# Patient Record
Sex: FEMALE | Race: WHITE | NOT HISPANIC OR LATINO | Employment: FULL TIME | URBAN - METROPOLITAN AREA
[De-identification: names, ages, dates, MRNs, and addresses within clinical notes are randomized per-mention and may not be internally consistent; named-entity substitution may affect disease eponyms.]

---

## 2021-11-24 ENCOUNTER — OFFICE VISIT (OUTPATIENT)
Dept: OTOLARYNGOLOGY | Facility: CLINIC | Age: 31
End: 2021-11-24

## 2021-11-24 VITALS — HEIGHT: 65 IN | BODY MASS INDEX: 40.33 KG/M2 | WEIGHT: 242.06 LBS

## 2021-11-24 DIAGNOSIS — H66.92 LEFT OTITIS MEDIA, UNSPECIFIED OTITIS MEDIA TYPE: Primary | ICD-10-CM

## 2021-11-24 PROCEDURE — 99202 OFFICE O/P NEW SF 15 MIN: CPT | Mod: PBBFAC | Performed by: PHYSICIAN ASSISTANT

## 2021-11-24 PROCEDURE — 99203 PR OFFICE/OUTPT VISIT, NEW, LEVL III, 30-44 MIN: ICD-10-PCS | Mod: S$PBB,,, | Performed by: PHYSICIAN ASSISTANT

## 2021-11-24 PROCEDURE — 99999 PR PBB SHADOW E&M-NEW PATIENT-LVL II: CPT | Mod: PBBFAC,,, | Performed by: PHYSICIAN ASSISTANT

## 2021-11-24 PROCEDURE — 99999 PR PBB SHADOW E&M-NEW PATIENT-LVL II: ICD-10-PCS | Mod: PBBFAC,,, | Performed by: PHYSICIAN ASSISTANT

## 2021-11-24 PROCEDURE — 99203 OFFICE O/P NEW LOW 30 MIN: CPT | Mod: S$PBB,,, | Performed by: PHYSICIAN ASSISTANT

## 2021-11-24 RX ORDER — PREDNISONE 10 MG/1
TABLET ORAL
Qty: 13 TABLET | Refills: 0 | Status: ON HOLD | OUTPATIENT
Start: 2021-11-24 | End: 2023-10-25 | Stop reason: CLARIF

## 2021-11-24 RX ORDER — AMOXICILLIN AND CLAVULANATE POTASSIUM 875; 125 MG/1; MG/1
1 TABLET, FILM COATED ORAL EVERY 12 HOURS
Qty: 20 TABLET | Refills: 0 | Status: SHIPPED | OUTPATIENT
Start: 2021-11-24 | End: 2021-12-04

## 2021-11-24 RX ORDER — FLUOXETINE HYDROCHLORIDE 20 MG/1
20 CAPSULE ORAL DAILY
Status: ON HOLD | COMMUNITY
End: 2023-10-25 | Stop reason: CLARIF

## 2021-12-09 ENCOUNTER — TELEPHONE (OUTPATIENT)
Dept: OTOLARYNGOLOGY | Facility: CLINIC | Age: 31
End: 2021-12-09

## 2021-12-10 ENCOUNTER — TELEPHONE (OUTPATIENT)
Dept: OTOLARYNGOLOGY | Facility: CLINIC | Age: 31
End: 2021-12-10

## 2022-07-14 ENCOUNTER — OFFICE VISIT (OUTPATIENT)
Dept: PHYSICAL MEDICINE AND REHAB | Facility: CLINIC | Age: 32
End: 2022-07-14
Payer: COMMERCIAL

## 2022-07-14 VITALS — BODY MASS INDEX: 41.65 KG/M2 | HEIGHT: 65 IN | WEIGHT: 250 LBS

## 2022-07-14 DIAGNOSIS — S94.20XA: Primary | ICD-10-CM

## 2022-07-14 PROCEDURE — 99999 PR PBB SHADOW E&M-EST. PATIENT-LVL III: CPT | Mod: PBBFAC,,, | Performed by: PHYSICAL MEDICINE & REHABILITATION

## 2022-07-14 PROCEDURE — 76942 ECHO GUIDE FOR BIOPSY: CPT | Mod: S$GLB,,, | Performed by: PHYSICAL MEDICINE & REHABILITATION

## 2022-07-14 PROCEDURE — 1159F PR MEDICATION LIST DOCUMENTED IN MEDICAL RECORD: ICD-10-PCS | Mod: CPTII,S$GLB,, | Performed by: PHYSICAL MEDICINE & REHABILITATION

## 2022-07-14 PROCEDURE — 99203 PR OFFICE/OUTPT VISIT, NEW, LEVL III, 30-44 MIN: ICD-10-PCS | Mod: 25,S$GLB,, | Performed by: PHYSICAL MEDICINE & REHABILITATION

## 2022-07-14 PROCEDURE — 99999 PR PBB SHADOW E&M-EST. PATIENT-LVL III: ICD-10-PCS | Mod: PBBFAC,,, | Performed by: PHYSICAL MEDICINE & REHABILITATION

## 2022-07-14 PROCEDURE — 1160F PR REVIEW ALL MEDS BY PRESCRIBER/CLIN PHARMACIST DOCUMENTED: ICD-10-PCS | Mod: CPTII,S$GLB,, | Performed by: PHYSICAL MEDICINE & REHABILITATION

## 2022-07-14 PROCEDURE — 1159F MED LIST DOCD IN RCRD: CPT | Mod: CPTII,S$GLB,, | Performed by: PHYSICAL MEDICINE & REHABILITATION

## 2022-07-14 PROCEDURE — 3008F BODY MASS INDEX DOCD: CPT | Mod: CPTII,S$GLB,, | Performed by: PHYSICAL MEDICINE & REHABILITATION

## 2022-07-14 PROCEDURE — 64450 NERVE BLOCK: ICD-10-PCS | Mod: LT,S$GLB,, | Performed by: PHYSICAL MEDICINE & REHABILITATION

## 2022-07-14 PROCEDURE — 99203 OFFICE O/P NEW LOW 30 MIN: CPT | Mod: 25,S$GLB,, | Performed by: PHYSICAL MEDICINE & REHABILITATION

## 2022-07-14 PROCEDURE — 64450 NJX AA&/STRD OTHER PN/BRANCH: CPT | Mod: LT,S$GLB,, | Performed by: PHYSICAL MEDICINE & REHABILITATION

## 2022-07-14 PROCEDURE — 1160F RVW MEDS BY RX/DR IN RCRD: CPT | Mod: CPTII,S$GLB,, | Performed by: PHYSICAL MEDICINE & REHABILITATION

## 2022-07-14 PROCEDURE — 3008F PR BODY MASS INDEX (BMI) DOCUMENTED: ICD-10-PCS | Mod: CPTII,S$GLB,, | Performed by: PHYSICAL MEDICINE & REHABILITATION

## 2022-07-14 PROCEDURE — 76942 PR U/S GUIDANCE FOR NEEDLE GUIDANCE: ICD-10-PCS | Mod: S$GLB,,, | Performed by: PHYSICAL MEDICINE & REHABILITATION

## 2022-07-14 NOTE — PROGRESS NOTES
[facility]    CHIEF COMPLAINT:   Chief Complaint   Patient presents with    Foot Pain     Left foot     HISTORY OF PRESENT ILLNESS: Maura Carcamo is a 31 y.o. female who presents to me for the 1st time for evaluation and treatment of left foot pain.  The symptoms started about 4 days ago without trauma or known injury.  She reports that she woke up in the middle of the night with significant pain over the dorsum of the left foot.  The pain has persisted over the past few days along with some numbness over the dorsal foot, especially at the 1st dorsal webspace.  She rates her pain currently as a 2 on a scale of 1-10 the pain can rise to a level of an 8 at times.  She has been able to exercise without much discomfort but the pain seems to increase with sitting or lying down.  She received some dry needling to the lateral calf yesterday with some mild improvement.  She denies weakness of the left lower extremity.  She has some history of some back pain but has experienced no recent increase in back pain or radicular-type symptoms.    Review of Systems   Constitutional: Negative for fever.   HENT: Negative for drooling.    Eyes: Negative for discharge.   Respiratory: Negative for choking.    Cardiovascular: Negative for chest pain.   Genitourinary: Negative for flank pain.   Skin: Negative for wound.   Allergic/Immunologic: Negative for immunocompromised state.   Neurological: Negative for tremors and syncope.   Psychiatric/Behavioral: Negative for behavioral problems.     Past Medical History: History reviewed. No pertinent past medical history.    Past Surgical History: History reviewed. No pertinent surgical history.    Family History: History reviewed. No pertinent family history.    Medications:   Current Outpatient Medications on File Prior to Visit   Medication Sig Dispense Refill    FLUoxetine 20 MG capsule Take 20 mg by mouth once  "daily.      predniSONE (DELTASONE) 10 MG tablet Take 1 tablet three times daily for 2 days. Then 1 tablet twice daily for 2 days. Then 1 tablet once daily for 2 days. Then 1/2 tablet once daily for 2 days. (Patient not taking: Reported on 7/14/2022) 13 tablet 0     No current facility-administered medications on file prior to visit.       Allergies: Review of patient's allergies indicates:  No Known Allergies    Social History:   Social History     Socioeconomic History    Marital status:    Tobacco Use    Smoking status: Never Smoker    Smokeless tobacco: Never Used     Maura is an  at [employer].    PHYSICAL EXAMINATION:   General    Vitals:    07/14/22 1126   Weight: 113.4 kg (250 lb)   Height: 5' 5" (1.651 m)     Constitutional: Oriented to person, place, and time. No apparent distress. Pleasant.  HENT:   Head: Normocephalic and atraumatic.   Eyes: Right eye exhibits no discharge. Left eye exhibits no discharge. No scleral icterus.   Pulmonary/Chest: Effort normal. No respiratory distress.   Abdominal: There is no guarding.   Neurological: Alert and oriented to person, place, and time.   Psychiatric: Behavior is normal.   Left Ankle Exam     Tenderness   Left ankle tenderness location: Focally tender over the dorsum of the left foot.   Swelling: mild    Range of Motion   Dorsiflexion: 25   Plantar flexion: 45   Eversion: normal   Inversion: normal     Muscle Strength   Dorsiflexion:  5/5   Plantar flexion:  5/5   Posterior tibial:  5/5  Peroneal muscle:  5/5    Other   Erythema: absent  Scars: absent  Sensation: decreased (Reduced over the dorsal left foot especially at the 1st webspace in relation to the right)  Pulse: present    Comments:  Reflexes are 2+ and symmetric in the bilateral lower extremities  Tone is normal in the lower extremities  Tinel's is negative at the knee/peroneal nerve distribution  Strength is 5/5 in the left lower extremity, including " the extensor hallucis longus        INSPECTION: There is no ecchymoses, erythema or gross deformity of the left lower extremity.    Imaging  Diagnostic Ultrasound Exam  Examination: Ultrasound of the Foot - Left - dorsal - Limited    The images were saved and will be uploaded to EMR.     Date of Study: Today    Equipment: Denys Afiniti 70, MHz linear probe    History:  Foot pain - Left    Findings: The left deep peroneal nerve was observed in short axis at the level of the distal tibia adjacent to the anterior tibial artery.  The nerves normal appearance.  The nerve is followed distally into the foot.  At the level of the midfoot there was an area of hypoechoic echotexture surrounding the deep peroneal nerve.  No compressive masses or lesions were observed.  The nerve itself did appear to be somewhat enlarged at this level.  Palpation of this area produced a Tinel's distally into the 1st webspace.  Color Doppler function failed to show any vascular flow within this lesion.    Impression:  Left deep peroneal neuropathy/nerve sheath effusion    Data Reviewed: Ultrasound    Supportive Actions: Independent visualization of images or test specimens    ASSESSMENT:   1. Injury of deep peroneal nerve at foot level, initial encounter      PLAN:     1. Time was spent reviewing the above diagnosis in depth with Maura today, including acute management and rehabilitation.     2. Diagnostic ultrasound exam today (see full report above) was significant for an apparent deep peroneal nerve sheath effusion.  Her symptoms do correlate with deep peroneal neuropathy.  There is no direct trauma to the area.  We discussed this could be due to tight shoe wear.  Offered ultrasound-guided aspiration of the nerve sheath effusion with back filling of corticosteroid.  She wished to proceed.  See separate procedure note.  Only a small amount of very viscous substance was able to be aspirated from this lesion before back filling of  corticosteroid.    3. I recommended she avoid tight-fitting shoes for the next 1-2 weeks.  She may engage in some resistance training but is to also avoid high impact exercise for the next couple of weeks as well.      4. Return to clinic as needed if symptoms persist or reoccur.    Go to the following website to look up your diagnosis and learn more about it:     [email or web address]     If you have questions or concerns, please contact us via MyChart or email.      Please call the office with any issues.      Dr. Orestes Mcneal M.D., CAQ-SM, R-MSK  [facility]    The above note was completed, in part, with the aid of MModal Fluency Direct dictation software/hardware. Translation errors may be present.

## 2022-07-14 NOTE — PROCEDURES
"Nerve Block    Date/Time: 7/14/2022 11:30 AM  Performed by: Orestes Mcneal MD  Authorized by: Orestes Mcneal MD   Consent Done: Yes  Time out: Immediately prior to procedure a "time out" was called to verify the correct patient, procedure, equipment, support staff and site/side marked as required.  Indications: pain relief  Body area: lower extremity  Nerve: deep peroneal  Laterality: left    Patient sedated: no  Medications administered: DepoMedrol 40 mg injection  Preparation: Patient was prepped and draped in the usual sterile fashion.  Patient position: sitting  Needle size: 18 G  Location technique: ultrasound guidance  Local Anesthetic: lidocaine 1% without epinephrine  Anesthetic total: 4 mL  Patient tolerance: Patient tolerated the procedure well with no immediate complications  Comments: Ultrasound guidance was used for correct needle placement, the images were saved and will be uploaded to EMR.        A 27 gauge 1.5 in needle was guided under ultrasound guidance using a lateral to medial approach in plane to the ultrasound probe.  Approximately 4 cc of 1% lidocaine was injected superficially to the deep peroneal nerve.  Care was taken not to advance the needle into the adjacent artery.  Next, the 27 gauge needle was removed in favor of an 18 gauge needle.  This was also advanced using in plane lateral to medial approach into the apparent nerve sheath effusio.  Negative pressure was provided through a 3 cc syringe.  Less than 1 cc of viscous chronic to was evacuated from this area.  This needle was then removed in favor of another 27 gauge needle.  This was guided back into the nerve sheath and a mixture of 1 cc of 6 mg of Celestone and 1/2 cc of 1% lidocaine was injected into this area and the needle was then removed.  "